# Patient Record
(demographics unavailable — no encounter records)

---

## 2024-10-10 NOTE — ASSESSMENT
[Diabetes Foot Care] : diabetes foot care [Long Term Vascular Complications] : long term vascular complications of diabetes [Carbohydrate Consistent Diet] : carbohydrate consistent diet [Importance of Diet and Exercise] : importance of diet and exercise to improve glycemic control, achieve weight loss and improve cardiovascular health [Hypoglycemia Management] : hypoglycemia management [Self Monitoring of Blood Glucose] : self monitoring of blood glucose [Retinopathy Screening] : Patient was referred to ophthalmology for retinopathy screening [Diabetic Medications] : Risks and benefits of diabetic medications were discussed [FreeTextEntry1] : ! Type 2 DM  A1C still elevated. consider change in meds and discussed compliance..Labs done.  A1c  was much improved7.0 now over 9 since she was off her medications for almost 6 weeks when traveling.  She is aware this was not the correct thing to do going forward will be more compliant with her medications and diet and will try to cut down on her alcohol consumption.. Encourage healthy lifestyle including a low carb diet and exercise as tolerated. Monitor blood sugars as directed and bring meter  to all visits.  Gave her a new One Touch Verio meter last visit but this was not covered by insurance so she asked for the Contour glucose meter..  She does not want to use the sensor.

## 2024-10-10 NOTE — HISTORY OF PRESENT ILLNESS
[FreeTextEntry1] : Patient is here today for evaluation and management of type 2 diabetes. She is currently  off Trulicity 4.5  mg and on Ozempic  1mg  weekly , Jardiance 25 mg , and  metformin  1000 mg BID. . She had been using he Lakeshia sensor but stopped since irritated skin. .. She has a history of having had gestational diabetes and was on insulin at that time. On her recent eye exam she was told she had some bleeding she is due for a f/u appointment. .She was ill with Covid in the beginning of the pandemic.  feels diet is poor. Did meet with RD and is trying a modified Keto diet. Had lost 13 lbs but regaining. Aware anemic but was taking FA i and  iron. . Menses are heavy at times.  Her cholesterol medication was changed from atorvastatin to rosuvastatin 40 mg. Her A1c is significantly higher because she was off her medications when traveling in [place] for 6 weeks in the summer.  She is now back on her medications.  She was also making poor food choices and drinking more alcohol.

## 2024-10-10 NOTE — PHYSICAL EXAM
[Alert] : alert [Well Nourished] : well nourished [No Acute Distress] : no acute distress [Well Developed] : well developed [EOMI] : extra ocular movement intact [No Proptosis] : no proptosis [Normal Oropharynx] : the oropharynx was normal [Thyroid Not Enlarged] : the thyroid was not enlarged [No Thyroid Nodules] : no palpable thyroid nodules [No Respiratory Distress] : no respiratory distress [No Accessory Muscle Use] : no accessory muscle use [Clear to Auscultation] : lungs were clear to auscultation bilaterally [Normal S1, S2] : normal S1 and S2 [Normal Rate] : heart rate was normal [Regular Rhythm] : with a regular rhythm [No Edema] : no peripheral edema [Pedal Pulses Normal] : the pedal pulses are present [Normal Bowel Sounds] : normal bowel sounds [Not Tender] : non-tender [Not Distended] : not distended [Soft] : abdomen soft [Normal Anterior Cervical Nodes] : no anterior cervical lymphadenopathy [Normal Gait] : normal gait [Normal Strength/Tone] : muscle strength and tone were normal [No Rash] : no rash [Normal Reflexes] : deep tendon reflexes were 2+ and symmetric [No Tremors] : no tremors [Oriented x3] : oriented to person, place, and time [Acanthosis Nigricans] : no acanthosis nigricans

## 2025-01-16 NOTE — HISTORY OF PRESENT ILLNESS
[FreeTextEntry1] : Patient is here today for evaluation and management of type 2 diabetes. She is currently  off Trulicity 4.5  mg and on Ozempic  1mg  weekly , Jardiance 25 mg , and  metformin  1000 mg BID. .  She has been losing weight and her sugars are high so on her own she changed Ozempic to every other week.  She had been using he Lakeshia sensor but stopped since irritated skin.  Currently is not monitoring her blood sugar her A1c is 9.5.  She denies increased frequency of urination... She has a history of having had gestational diabetes and was on insulin at that time. On her recent eye exam she was told she had some bleeding she is due for a f/u appointment. .She was ill with Covid in the beginning of the pandemic.  feels diet is poor. Did meet with RD and is trying a modified Keto diet. Had lost 13 lbs but regaining. Aware anemic but was taking FA i and  iron. . Menses are heavy at times.  Her cholesterol medication was changed from atorvastatin to rosuvastatin 40 mg. She had a colonoscopy this morning.

## 2025-01-16 NOTE — PHYSICAL EXAM
[Alert] : alert [Well Nourished] : well nourished [No Acute Distress] : no acute distress [Well Developed] : well developed [EOMI] : extra ocular movement intact [No Proptosis] : no proptosis [Normal Oropharynx] : the oropharynx was normal [Thyroid Not Enlarged] : the thyroid was not enlarged [No Thyroid Nodules] : no palpable thyroid nodules [No Respiratory Distress] : no respiratory distress [No Accessory Muscle Use] : no accessory muscle use [Clear to Auscultation] : lungs were clear to auscultation bilaterally [Normal S1, S2] : normal S1 and S2 [Regular Rhythm] : with a regular rhythm [No Edema] : no peripheral edema [Pedal Pulses Normal] : the pedal pulses are present [Normal Bowel Sounds] : normal bowel sounds [Not Tender] : non-tender [Not Distended] : not distended [Soft] : abdomen soft [Normal Anterior Cervical Nodes] : no anterior cervical lymphadenopathy [Normal Gait] : normal gait [Normal Strength/Tone] : muscle strength and tone were normal [No Rash] : no rash [Normal Reflexes] : deep tendon reflexes were 2+ and symmetric [No Tremors] : no tremors [Oriented x3] : oriented to person, place, and time [Acanthosis Nigricans] : no acanthosis nigricans [de-identified] : Rapid heart rate

## 2025-01-16 NOTE — ASSESSMENT
[Diabetes Foot Care] : diabetes foot care [Long Term Vascular Complications] : long term vascular complications of diabetes [Carbohydrate Consistent Diet] : carbohydrate consistent diet [Importance of Diet and Exercise] : importance of diet and exercise to improve glycemic control, achieve weight loss and improve cardiovascular health [Hypoglycemia Management] : hypoglycemia management [Self Monitoring of Blood Glucose] : self monitoring of blood glucose [Retinopathy Screening] : Patient was referred to ophthalmology for retinopathy screening [Diabetic Medications] : Risks and benefits of diabetic medications were discussed [FreeTextEntry1] : ! Type 2 DM  A1C still elevated. consider change in meds and discussed compliance. A1c was much improved7.0 now over 9 she will resume Ozempic 1 mg weekly but will do a fasting lab checking for her insulin level.  Will also check TFTs since she had a rapid heart rate today but just finished having a colonoscopy. Encourage healthy lifestyle including a low carb diet and exercise as tolerated. Monitor blood sugars as directed and bring meter  to all visits.  Gave her a new One Touch Verio meter last visit but this was not covered by insurance so she asked for the Contour glucose meter..  She does not want to use the sensor.  But has agreed to use it over the next few weeks and will follow-up with CDE in 2 weeks.

## 2025-04-22 NOTE — ASSESSMENT
[Diabetes Foot Care] : diabetes foot care [Long Term Vascular Complications] : long term vascular complications of diabetes [Carbohydrate Consistent Diet] : carbohydrate consistent diet [Importance of Diet and Exercise] : importance of diet and exercise to improve glycemic control, achieve weight loss and improve cardiovascular health [Hypoglycemia Management] : hypoglycemia management [Self Monitoring of Blood Glucose] : self monitoring of blood glucose [Retinopathy Screening] : Patient was referred to ophthalmology for retinopathy screening [Diabetic Medications] : Risks and benefits of diabetic medications were discussed [FreeTextEntry1] : ! Type 2 DM  A1C still elevated. consider change in meds and discussed compliance. A1c was much improved7.0 then was over 9 she will continue Ozempic 1 mg metformin and Jardiance.  Labs were done by her PCP today. Encourage healthy lifestyle including a low carb diet and exercise as tolerated. Monitor blood sugars as directed and bring meter  to all visits.  Gave her a new One Touch Verio meter last visit but this was not covered by insurance so she asked for the Contour glucose meter..  She does not want to use the sensor.  I have suggested that she wear 2 weeks before her next visit with me.

## 2025-04-22 NOTE — HISTORY OF PRESENT ILLNESS
[FreeTextEntry1] : Patient is here today for evaluation and management of type 2 diabetes. She is currently  off Trulicity 4.5  mg and on Ozempic  1mg  weekly , Jardiance 25 mg , and  metformin  1000 mg BID. .  She has been losing weight and her sugars are high so on her own she changed Ozempic to every other week.  She had been using he Lakeshia sensor but stopped since irritated skin.  Currently is not monitoring her blood sugar her A1c is 9.5.  She denies increased frequency of urination... She has a history of having had gestational diabetes and was on insulin at that time. On her recent eye exam she was told she had some bleeding she is due for a f/u appointment. .She was ill with Covid in the beginning of the pandemic.  feels diet is poor. Did meet with RD and is trying a modified Keto diet. Had lost 13 lbs but regaining. Aware anemic but was taking FA i and  iron. . Menses are heavy at times.  Her cholesterol medication was changed from atorvastatin to rosuvastatin 40 mg. She had a colonoscopy.

## 2025-04-22 NOTE — PHYSICAL EXAM
[Alert] : alert [Well Nourished] : well nourished [No Acute Distress] : no acute distress [Well Developed] : well developed [EOMI] : extra ocular movement intact [No Proptosis] : no proptosis [Normal Oropharynx] : the oropharynx was normal [Thyroid Not Enlarged] : the thyroid was not enlarged [No Thyroid Nodules] : no palpable thyroid nodules [No Respiratory Distress] : no respiratory distress [No Accessory Muscle Use] : no accessory muscle use [Clear to Auscultation] : lungs were clear to auscultation bilaterally [Normal S1, S2] : normal S1 and S2 [Regular Rhythm] : with a regular rhythm [No Edema] : no peripheral edema [Pedal Pulses Normal] : the pedal pulses are present [Normal Bowel Sounds] : normal bowel sounds [Not Tender] : non-tender [Not Distended] : not distended [Soft] : abdomen soft [Normal Anterior Cervical Nodes] : no anterior cervical lymphadenopathy [Normal Gait] : normal gait [Normal Strength/Tone] : muscle strength and tone were normal [No Rash] : no rash [Acanthosis Nigricans] : no acanthosis nigricans [Normal Reflexes] : deep tendon reflexes were 2+ and symmetric [No Tremors] : no tremors [Oriented x3] : oriented to person, place, and time [de-identified] : Rapid heart rate

## 2025-07-24 NOTE — HISTORY OF PRESENT ILLNESS
[FreeTextEntry1] : Patient is here today for evaluation and management of type 2 diabetes. She is currently  off Trulicity 4.5  mg and on Ozempic  1mg  weekly , Jardiance 25 mg , and  metformin  1000 mg BID. .  She has been losing weight and her sugars are high so on her own she changed Ozempic to every other week.  She had been using he Lakeshia sensor but stopped since irritated skin.  Currently is not monitoring her blood sugar her last A1c was 9.5.  She denies increased frequency of urination... She has a history of having had gestational diabetes and was on insulin at that time. On her recent eye exam she was told she had some bleeding she is due for a f/u appointment. .feels diet is poor. Did meet with RD and is trying a modified Keto diet. Had lost 13 lbs but regaining. Aware anemic but was taking FA i and  iron. . Menses are heavy at times.  Her cholesterol medication was changed from atorvastatin to rosuvastatin 40 mg.

## 2025-07-24 NOTE — ASSESSMENT
[Diabetes Foot Care] : diabetes foot care [Long Term Vascular Complications] : long term vascular complications of diabetes [Carbohydrate Consistent Diet] : carbohydrate consistent diet [Importance of Diet and Exercise] : importance of diet and exercise to improve glycemic control, achieve weight loss and improve cardiovascular health [Hypoglycemia Management] : hypoglycemia management [Self Monitoring of Blood Glucose] : self monitoring of blood glucose [Retinopathy Screening] : Patient was referred to ophthalmology for retinopathy screening [Diabetic Medications] : Risks and benefits of diabetic medications were discussed [FreeTextEntry1] : ! Type 2 DM  A1C still elevated. consider change in meds and discussed compliance. A1c was much improved 7.0 then was over 9 she will continue Ozempic 1 mg metformin and Jardiance.  Encourage healthy lifestyle including a low carb diet and exercise as tolerated. Monitor blood sugars as directed and bring meter  to all visits.  Gave her a new One Touch Verio meter last visit but this was not covered by insurance so she asked for the Contour glucose meter..  She does not want to use the sensor.  I have suggested that she wear 2 weeks before her next visit with me.to do labs f/u CDE i mos and myself 3 mos